# Patient Record
Sex: FEMALE | Employment: UNEMPLOYED | URBAN - METROPOLITAN AREA
[De-identification: names, ages, dates, MRNs, and addresses within clinical notes are randomized per-mention and may not be internally consistent; named-entity substitution may affect disease eponyms.]

---

## 2019-01-15 LAB
C TRACH DNA SPEC QL PROBE+SIG AMP: NEGATIVE
HBV SURFACE AG SERPL QL IA: NORMAL
HIV 1+2 AB+HIV1 P24 AG SERPL QL IA: NORMAL
N GONORRHOEA DNA SPEC QL PROBE+SIG AMP: NEGATIVE
RUBELLA ABY IGG: NORMAL
RUBELLA ANTIBODY IGG QUANTITATIVE: 5.12 IU/ML

## 2019-02-13 LAB — GROUP B STREP PCR: NEGATIVE

## 2019-02-23 ENCOUNTER — NURSE TRIAGE (OUTPATIENT)
Dept: NURSING | Facility: CLINIC | Age: 34
End: 2019-02-23

## 2019-02-23 ENCOUNTER — HOSPITAL ENCOUNTER (OUTPATIENT)
Facility: CLINIC | Age: 34
Discharge: HOME | End: 2019-02-23
Attending: OBSTETRICS & GYNECOLOGY | Admitting: OBSTETRICS & GYNECOLOGY
Payer: MEDICAID

## 2019-02-23 VITALS — DIASTOLIC BLOOD PRESSURE: 74 MMHG | RESPIRATION RATE: 18 BRPM | SYSTOLIC BLOOD PRESSURE: 130 MMHG | TEMPERATURE: 98.6 F

## 2019-02-23 DIAGNOSIS — Z36.89 ENCOUNTER FOR TRIAGE IN PREGNANT PATIENT: Primary | ICD-10-CM

## 2019-02-23 LAB
ALBUMIN UR-MCNC: NEGATIVE MG/DL
APPEARANCE UR: ABNORMAL
BACTERIA #/AREA URNS HPF: ABNORMAL /HPF
BILIRUB UR QL STRIP: NEGATIVE
COLOR UR AUTO: YELLOW
GLUCOSE UR STRIP-MCNC: NEGATIVE MG/DL
HGB UR QL STRIP: NEGATIVE
KETONES UR STRIP-MCNC: 20 MG/DL
LEUKOCYTE ESTERASE UR QL STRIP: ABNORMAL
MUCOUS THREADS #/AREA URNS LPF: PRESENT /LPF
NITRATE UR QL: POSITIVE
PH UR STRIP: 7 PH (ref 5–7)
RBC #/AREA URNS AUTO: 1 /HPF (ref 0–2)
SOURCE: ABNORMAL
SP GR UR STRIP: 1.01 (ref 1–1.03)
SQUAMOUS #/AREA URNS AUTO: 4 /HPF (ref 0–1)
TRANS CELLS #/AREA URNS HPF: 1 /HPF (ref 0–1)
UROBILINOGEN UR STRIP-MCNC: 0 MG/DL (ref 0–2)
WBC #/AREA URNS AUTO: 4 /HPF (ref 0–5)

## 2019-02-23 PROCEDURE — 81001 URINALYSIS AUTO W/SCOPE: CPT | Performed by: OBSTETRICS & GYNECOLOGY

## 2019-02-23 PROCEDURE — 87086 URINE CULTURE/COLONY COUNT: CPT | Performed by: OBSTETRICS & GYNECOLOGY

## 2019-02-23 PROCEDURE — 59025 FETAL NON-STRESS TEST: CPT

## 2019-02-23 PROCEDURE — 25000132 ZZH RX MED GY IP 250 OP 250 PS 637: Performed by: OBSTETRICS & GYNECOLOGY

## 2019-02-23 PROCEDURE — 87186 SC STD MICRODIL/AGAR DIL: CPT | Performed by: OBSTETRICS & GYNECOLOGY

## 2019-02-23 PROCEDURE — 87088 URINE BACTERIA CULTURE: CPT | Performed by: OBSTETRICS & GYNECOLOGY

## 2019-02-23 PROCEDURE — G0463 HOSPITAL OUTPT CLINIC VISIT: HCPCS | Mod: 25

## 2019-02-23 RX ORDER — HYDROXYZINE HYDROCHLORIDE 50 MG/1
100 TABLET, FILM COATED ORAL ONCE
Status: COMPLETED | OUTPATIENT
Start: 2019-02-23 | End: 2019-02-23

## 2019-02-23 RX ORDER — FLUCONAZOLE 100 MG/1
100 TABLET ORAL DAILY
Status: ON HOLD | COMMUNITY
End: 2019-02-26

## 2019-02-23 RX ORDER — NITROFURANTOIN 25; 75 MG/1; MG/1
100 CAPSULE ORAL 2 TIMES DAILY
Qty: 14 CAPSULE | Refills: 0 | Status: ON HOLD | OUTPATIENT
Start: 2019-02-23 | End: 2019-02-26

## 2019-02-23 RX ADMIN — HYDROXYZINE HYDROCHLORIDE 100 MG: 50 TABLET, FILM COATED ORAL at 21:39

## 2019-02-23 NOTE — TELEPHONE ENCOUNTER
"C/o R lower abdominal pain for 10 min. States she is 37 wks pregnant. OB provider is Dr. Messi Savage @ [facility]. Advised pt it is best that she call her own provider's office now and talk w/ the on-call doctor or a triage nurse. Advised her to wait on the line even if it says clinic is closed or follow the prompts. Go to L&D if unable to reach provider w/i next 1 hour. Go immediately if you feel worse. Pt voiced understanding and agreement. Darshana Oreilly RN/FNA      Reason for Disposition    MODERATE-SEVERE abdominal pain (e.g., interferes with normal activities, awakens from sleep)    Additional Information    Negative: Passed out (i.e., lost consciousness, collapsed and was not responding)    Negative: Shock suspected (e.g., cold/pale/clammy skin, too weak to stand, low BP, rapid pulse)    Negative: Difficult to awaken or acting confused  (e.g., disoriented, slurred speech)    Negative: [1] SEVERE abdominal pain (e.g., excruciating) AND [2] constant AND [3] present > 1 hour    Negative: SEVERE vaginal bleeding (e.g., continuous red blood from vagina, or large blood clots)    Negative: Sounds like a life-threatening emergency to the triager    Negative: Followed an abdomen (stomach) injury    Negative: [1] Having contractions or other symptoms of labor AND [2] >= 37 weeks pregnant (i.e., term pregnancy)    Negative: [1] Having contractions or other symptoms of labor AND [2] < 37 weeks pregnant (i.e., )    Negative: [1] Abdominal pain AND [2] pregnant < 20 weeks    Negative: [1] Vomiting AND [2] contains red blood or black (\"coffee ground\") material  (Exception: few red streaks in vomit that only happened once)    Protocols used: PREGNANCY - ABDOMINAL PAIN GREATER THAN 20 WEEKS EGA-ADULT-      "

## 2019-02-24 ENCOUNTER — ANESTHESIA (OUTPATIENT)
Dept: OBGYN | Facility: CLINIC | Age: 34
End: 2019-02-24
Payer: MEDICAID

## 2019-02-24 ENCOUNTER — ANESTHESIA EVENT (OUTPATIENT)
Dept: OBGYN | Facility: CLINIC | Age: 34
End: 2019-02-24
Payer: MEDICAID

## 2019-02-24 ENCOUNTER — HOSPITAL ENCOUNTER (INPATIENT)
Facility: CLINIC | Age: 34
LOS: 2 days | Discharge: HOME | End: 2019-02-26
Attending: OBSTETRICS & GYNECOLOGY | Admitting: OBSTETRICS & GYNECOLOGY
Payer: MEDICAID

## 2019-02-24 DIAGNOSIS — O23.43 URINARY TRACT INFECTION IN MOTHER DURING THIRD TRIMESTER OF PREGNANCY: ICD-10-CM

## 2019-02-24 LAB
ABO + RH BLD: NORMAL
ABO + RH BLD: NORMAL
BLD GP AB SCN SERPL QL: NORMAL
BLOOD BANK CMNT PATIENT-IMP: NORMAL
HGB BLD-MCNC: 12.4 G/DL (ref 11.7–15.7)
SPECIMEN EXP DATE BLD: NORMAL

## 2019-02-24 PROCEDURE — 86901 BLOOD TYPING SEROLOGIC RH(D): CPT | Performed by: OBSTETRICS & GYNECOLOGY

## 2019-02-24 PROCEDURE — 88307 TISSUE EXAM BY PATHOLOGIST: CPT | Performed by: OBSTETRICS & GYNECOLOGY

## 2019-02-24 PROCEDURE — 37000011 ZZH ANESTHESIA WARD SERVICE

## 2019-02-24 PROCEDURE — 25800030 ZZH RX IP 258 OP 636: Performed by: OBSTETRICS & GYNECOLOGY

## 2019-02-24 PROCEDURE — 88341 IMHCHEM/IMCYTCHM EA ADD ANTB: CPT | Mod: 26 | Performed by: OBSTETRICS & GYNECOLOGY

## 2019-02-24 PROCEDURE — 00HU33Z INSERTION OF INFUSION DEVICE INTO SPINAL CANAL, PERCUTANEOUS APPROACH: ICD-10-PCS | Performed by: ANESTHESIOLOGY

## 2019-02-24 PROCEDURE — 12000000 ZZH R&B MED SURG/OB

## 2019-02-24 PROCEDURE — 85018 HEMOGLOBIN: CPT | Performed by: OBSTETRICS & GYNECOLOGY

## 2019-02-24 PROCEDURE — 86900 BLOOD TYPING SEROLOGIC ABO: CPT | Performed by: OBSTETRICS & GYNECOLOGY

## 2019-02-24 PROCEDURE — 88342 IMHCHEM/IMCYTCHM 1ST ANTB: CPT | Performed by: OBSTETRICS & GYNECOLOGY

## 2019-02-24 PROCEDURE — G0463 HOSPITAL OUTPT CLINIC VISIT: HCPCS

## 2019-02-24 PROCEDURE — 88307 TISSUE EXAM BY PATHOLOGIST: CPT | Mod: 26 | Performed by: OBSTETRICS & GYNECOLOGY

## 2019-02-24 PROCEDURE — 72200001 ZZH LABOR CARE VAGINAL DELIVERY SINGLE

## 2019-02-24 PROCEDURE — 88341 IMHCHEM/IMCYTCHM EA ADD ANTB: CPT | Performed by: OBSTETRICS & GYNECOLOGY

## 2019-02-24 PROCEDURE — 88342 IMHCHEM/IMCYTCHM 1ST ANTB: CPT | Mod: 26 | Performed by: OBSTETRICS & GYNECOLOGY

## 2019-02-24 PROCEDURE — 0064U ANTB TP TOTAL&RPR IA QUAL: CPT | Performed by: OBSTETRICS & GYNECOLOGY

## 2019-02-24 PROCEDURE — 86850 RBC ANTIBODY SCREEN: CPT | Performed by: OBSTETRICS & GYNECOLOGY

## 2019-02-24 PROCEDURE — 25000128 H RX IP 250 OP 636: Performed by: ANESTHESIOLOGY

## 2019-02-24 PROCEDURE — 40000671 ZZH STATISTIC ANESTHESIA CASE

## 2019-02-24 PROCEDURE — 25000125 ZZHC RX 250: Performed by: OBSTETRICS & GYNECOLOGY

## 2019-02-24 PROCEDURE — 3E0R3BZ INTRODUCTION OF ANESTHETIC AGENT INTO SPINAL CANAL, PERCUTANEOUS APPROACH: ICD-10-PCS | Performed by: ANESTHESIOLOGY

## 2019-02-24 RX ORDER — PRENATAL VIT/IRON FUM/FOLIC AC 27MG-0.8MG
1 TABLET ORAL DAILY
COMMUNITY

## 2019-02-24 RX ORDER — NALBUPHINE HYDROCHLORIDE 10 MG/ML
2.5-5 INJECTION, SOLUTION INTRAMUSCULAR; INTRAVENOUS; SUBCUTANEOUS EVERY 6 HOURS PRN
Status: DISCONTINUED | OUTPATIENT
Start: 2019-02-24 | End: 2019-02-25 | Stop reason: HOSPADM

## 2019-02-24 RX ORDER — FENTANYL CITRATE 50 UG/ML
50-100 INJECTION, SOLUTION INTRAMUSCULAR; INTRAVENOUS
Status: DISCONTINUED | OUTPATIENT
Start: 2019-02-24 | End: 2019-02-26 | Stop reason: HOSPADM

## 2019-02-24 RX ORDER — NALOXONE HYDROCHLORIDE 0.4 MG/ML
.1-.4 INJECTION, SOLUTION INTRAMUSCULAR; INTRAVENOUS; SUBCUTANEOUS
Status: DISCONTINUED | OUTPATIENT
Start: 2019-02-24 | End: 2019-02-25

## 2019-02-24 RX ORDER — ONDANSETRON 4 MG/1
4 TABLET, ORALLY DISINTEGRATING ORAL EVERY 6 HOURS PRN
Status: DISCONTINUED | OUTPATIENT
Start: 2019-02-24 | End: 2019-02-25 | Stop reason: HOSPADM

## 2019-02-24 RX ORDER — EPHEDRINE SULFATE 50 MG/ML
INJECTION, SOLUTION INTRAMUSCULAR; INTRAVENOUS; SUBCUTANEOUS
Status: DISCONTINUED
Start: 2019-02-24 | End: 2019-02-25 | Stop reason: WASHOUT

## 2019-02-24 RX ORDER — ACETAMINOPHEN 325 MG/1
650 TABLET ORAL EVERY 4 HOURS PRN
Status: DISCONTINUED | OUTPATIENT
Start: 2019-02-24 | End: 2019-02-25

## 2019-02-24 RX ORDER — METHYLERGONOVINE MALEATE 0.2 MG/ML
200 INJECTION INTRAVENOUS
Status: DISCONTINUED | OUTPATIENT
Start: 2019-02-24 | End: 2019-02-25

## 2019-02-24 RX ORDER — OXYCODONE AND ACETAMINOPHEN 5; 325 MG/1; MG/1
1 TABLET ORAL
Status: DISCONTINUED | OUTPATIENT
Start: 2019-02-24 | End: 2019-02-25

## 2019-02-24 RX ORDER — NALOXONE HYDROCHLORIDE 0.4 MG/ML
.1-.4 INJECTION, SOLUTION INTRAMUSCULAR; INTRAVENOUS; SUBCUTANEOUS
Status: DISCONTINUED | OUTPATIENT
Start: 2019-02-24 | End: 2019-02-24

## 2019-02-24 RX ORDER — CARBOPROST TROMETHAMINE 250 UG/ML
250 INJECTION, SOLUTION INTRAMUSCULAR
Status: DISCONTINUED | OUTPATIENT
Start: 2019-02-24 | End: 2019-02-25

## 2019-02-24 RX ORDER — BUPIVACAINE HCL/0.9 % NACL/PF 0.125 %
PLASTIC BAG, INJECTION (ML) EPIDURAL
Status: DISCONTINUED
Start: 2019-02-24 | End: 2019-02-25 | Stop reason: HOSPADM

## 2019-02-24 RX ORDER — BUPIVACAINE HCL/0.9 % NACL/PF 0.125 %
PLASTIC BAG, INJECTION (ML) EPIDURAL CONTINUOUS
Status: DISCONTINUED | OUTPATIENT
Start: 2019-02-24 | End: 2019-02-25 | Stop reason: HOSPADM

## 2019-02-24 RX ORDER — ONDANSETRON 2 MG/ML
4 INJECTION INTRAMUSCULAR; INTRAVENOUS EVERY 6 HOURS PRN
Status: DISCONTINUED | OUTPATIENT
Start: 2019-02-24 | End: 2019-02-25 | Stop reason: HOSPADM

## 2019-02-24 RX ORDER — OXYTOCIN 10 [USP'U]/ML
10 INJECTION, SOLUTION INTRAMUSCULAR; INTRAVENOUS
Status: DISCONTINUED | OUTPATIENT
Start: 2019-02-24 | End: 2019-02-25

## 2019-02-24 RX ORDER — SODIUM CHLORIDE, SODIUM LACTATE, POTASSIUM CHLORIDE, CALCIUM CHLORIDE 600; 310; 30; 20 MG/100ML; MG/100ML; MG/100ML; MG/100ML
INJECTION, SOLUTION INTRAVENOUS CONTINUOUS
Status: DISCONTINUED | OUTPATIENT
Start: 2019-02-24 | End: 2019-02-26 | Stop reason: HOSPADM

## 2019-02-24 RX ORDER — ONDANSETRON 2 MG/ML
4 INJECTION INTRAMUSCULAR; INTRAVENOUS EVERY 6 HOURS PRN
Status: DISCONTINUED | OUTPATIENT
Start: 2019-02-24 | End: 2019-02-24

## 2019-02-24 RX ORDER — EPHEDRINE SULFATE 50 MG/ML
5 INJECTION, SOLUTION INTRAMUSCULAR; INTRAVENOUS; SUBCUTANEOUS
Status: DISCONTINUED | OUTPATIENT
Start: 2019-02-24 | End: 2019-02-25 | Stop reason: HOSPADM

## 2019-02-24 RX ORDER — OXYTOCIN/0.9 % SODIUM CHLORIDE 30/500 ML
100-340 PLASTIC BAG, INJECTION (ML) INTRAVENOUS CONTINUOUS PRN
Status: COMPLETED | OUTPATIENT
Start: 2019-02-24 | End: 2019-02-24

## 2019-02-24 RX ORDER — IBUPROFEN 800 MG/1
800 TABLET, FILM COATED ORAL
Status: DISCONTINUED | OUTPATIENT
Start: 2019-02-24 | End: 2019-02-25

## 2019-02-24 RX ORDER — LIDOCAINE HYDROCHLORIDE AND EPINEPHRINE 15; 5 MG/ML; UG/ML
3 INJECTION, SOLUTION EPIDURAL
Status: DISCONTINUED | OUTPATIENT
Start: 2019-02-24 | End: 2019-02-25 | Stop reason: HOSPADM

## 2019-02-24 RX ADMIN — SODIUM CHLORIDE, POTASSIUM CHLORIDE, SODIUM LACTATE AND CALCIUM CHLORIDE 500 ML: 600; 310; 30; 20 INJECTION, SOLUTION INTRAVENOUS at 23:28

## 2019-02-24 RX ADMIN — SODIUM CHLORIDE, POTASSIUM CHLORIDE, SODIUM LACTATE AND CALCIUM CHLORIDE 500 ML: 600; 310; 30; 20 INJECTION, SOLUTION INTRAVENOUS at 22:09

## 2019-02-24 RX ADMIN — SODIUM CHLORIDE, POTASSIUM CHLORIDE, SODIUM LACTATE AND CALCIUM CHLORIDE: 600; 310; 30; 20 INJECTION, SOLUTION INTRAVENOUS at 22:09

## 2019-02-24 RX ADMIN — Medication: at 22:30

## 2019-02-24 RX ADMIN — OXYTOCIN-SODIUM CHLORIDE 0.9% IV SOLN 30 UNIT/500ML 340 ML/HR: 30-0.9/5 SOLUTION at 23:50

## 2019-02-24 ASSESSMENT — ACTIVITIES OF DAILY LIVING (ADL): FALL_HISTORY_WITHIN_LAST_SIX_MONTHS: NO

## 2019-02-24 NOTE — PLAN OF CARE
Data: [facility]: 2019  5:41 PM.  Reason for maternal/fetal assessment is uterine contractions, decreased fetal movement. Patient reports abdominal pain approximately 1+ hour ago that has turned into contractions.  Patient is a .  Prenatal record reviewed. Pregnancy has been uncomplicated..  Gestational Age 37w2d. VSS. Fetal movement present. Patient denies leaking of vaginal fluid/rupture of membranes, vaginal bleeding, pelvic pressure, nausea, vomiting, headache, visual disturbances, epigastric or URQ pain, significant edema. Support person is present-sister of patient  Action: Verbal consent for EFM. Triage assessment completed. Bill of rights reviewed.  Response: Patient verbalized agreement with plan. Will contact Dr Lili Vincent with update and further orders.

## 2019-02-24 NOTE — PROVIDER NOTIFICATION
02/23/19 1844   Provider Notification   Provider Name/Title Dr Vincent   Method of Notification Electronic Page   Notification Reason Patient Arrived;SVE;Status Update;Uterine Activity;Pain     MD notified of patient arrival for rule out labor/DFM  Unfavorable cervix at 1.5/40/-4  Patient given option to go home or stay and be re-checked- patient would like to stay  MD okay with this plan and would like patient to orally hydrate which patient is doing currently  Category 1 tracing  Update MD after next check.

## 2019-02-24 NOTE — PROVIDER NOTIFICATION
02/23/19 2120   Provider Notification   Provider Name/Title Dr. Vincent   Method of Notification Phone   Request Evaluate - Remote   Notification Reason Lab/Diagnostic Study   Md notified of UA results  And cerivcal exam was the same.  Orders received to send pt home with Macrobid.  Pt may have a one time dose of Vistaril before discharge and then to follow up in the clinic this week.

## 2019-02-24 NOTE — DISCHARGE INSTRUCTIONS
Discharge Instruction for Undelivered Patients      You were seen for: Labor Assessment  We Consulted: Dr. Vincent  You had (Test or Medicine):fetal monitoring, oral hydration, cervical exam and UA    Diet:   Drink 8 to 12 glasses of liquids (milk, juice, water) every day.  You may eat meals and snacks.     Activity:  Count fetal kicks everyday (see handout)  Call your doctor or nurse midwife if your baby is moving less than usual.     Call your provider if you notice:  Swelling in your face or increased swelling in your hands or legs.  Headaches that are not relieved by Tylenol (acetaminophen).  Changes in your vision (blurring: seeing spots or stars.)  Nausea (sick to your stomach) and vomiting (throwing up).   Weight gain of 5 pounds or more per week.  Heartburn that doesn't go away.  Signs of bladder infection: pain when you urinate (use the toilet), need to go more often and more urgently.  The bag of mishra (rupture of membranes) breaks, or you notice leaking in your underwear.  Bright red blood in your underwear.  Abdominal (lower belly) or stomach pain.  Second (plus) baby: Contractions (tightening) less than 10 minutes apart and getting stronger.  Increase or change in vaginal discharge (note the color and amount)  Other: MD would like to take MAcrobid 100mg twice daily for 7 days.  Md would like you to be seen this week in clinic. Ok to take 1000mg of tylenol oral every 6-8 hours.    Follow-up:  Make an appointment to be seen on this week.

## 2019-02-25 LAB — T PALLIDUM AB SER QL: NONREACTIVE

## 2019-02-25 PROCEDURE — 0HQ9XZZ REPAIR PERINEUM SKIN, EXTERNAL APPROACH: ICD-10-PCS | Performed by: OBSTETRICS & GYNECOLOGY

## 2019-02-25 PROCEDURE — 25000132 ZZH RX MED GY IP 250 OP 250 PS 637: Performed by: OBSTETRICS & GYNECOLOGY

## 2019-02-25 PROCEDURE — 12000000 ZZH R&B MED SURG/OB

## 2019-02-25 PROCEDURE — G0463 HOSPITAL OUTPT CLINIC VISIT: HCPCS

## 2019-02-25 PROCEDURE — 40000084 ZZH STATISTIC IP LACTATION SERVICES 16-30 MIN

## 2019-02-25 PROCEDURE — 72200001 ZZH LABOR CARE VAGINAL DELIVERY SINGLE

## 2019-02-25 RX ORDER — AMPICILLIN TRIHYDRATE 500 MG
500 CAPSULE ORAL EVERY 6 HOURS SCHEDULED
Status: DISCONTINUED | OUTPATIENT
Start: 2019-02-25 | End: 2019-02-26 | Stop reason: HOSPADM

## 2019-02-25 RX ORDER — OXYTOCIN/0.9 % SODIUM CHLORIDE 30/500 ML
340 PLASTIC BAG, INJECTION (ML) INTRAVENOUS CONTINUOUS PRN
Status: DISCONTINUED | OUTPATIENT
Start: 2019-02-25 | End: 2019-02-26 | Stop reason: HOSPADM

## 2019-02-25 RX ORDER — METHYLERGONOVINE MALEATE 0.2 MG/ML
200 INJECTION INTRAVENOUS
Status: DISCONTINUED | OUTPATIENT
Start: 2019-02-25 | End: 2019-02-26 | Stop reason: HOSPADM

## 2019-02-25 RX ORDER — HYDROCORTISONE 2.5 %
CREAM (GRAM) TOPICAL 3 TIMES DAILY PRN
Status: DISCONTINUED | OUTPATIENT
Start: 2019-02-25 | End: 2019-02-26 | Stop reason: HOSPADM

## 2019-02-25 RX ORDER — AMOXICILLIN 250 MG
1 CAPSULE ORAL 2 TIMES DAILY
Status: DISCONTINUED | OUTPATIENT
Start: 2019-02-25 | End: 2019-02-26 | Stop reason: HOSPADM

## 2019-02-25 RX ORDER — IBUPROFEN 800 MG/1
800 TABLET, FILM COATED ORAL EVERY 6 HOURS PRN
Status: DISCONTINUED | OUTPATIENT
Start: 2019-02-25 | End: 2019-02-26 | Stop reason: HOSPADM

## 2019-02-25 RX ORDER — BISACODYL 10 MG
10 SUPPOSITORY, RECTAL RECTAL DAILY PRN
Status: DISCONTINUED | OUTPATIENT
Start: 2019-02-27 | End: 2019-02-26 | Stop reason: HOSPADM

## 2019-02-25 RX ORDER — FLUCONAZOLE 150 MG/1
150 TABLET ORAL ONCE
Status: COMPLETED | OUTPATIENT
Start: 2019-02-25 | End: 2019-02-25

## 2019-02-25 RX ORDER — NALOXONE HYDROCHLORIDE 0.4 MG/ML
.1-.4 INJECTION, SOLUTION INTRAMUSCULAR; INTRAVENOUS; SUBCUTANEOUS
Status: DISCONTINUED | OUTPATIENT
Start: 2019-02-25 | End: 2019-02-26 | Stop reason: HOSPADM

## 2019-02-25 RX ORDER — OXYTOCIN 10 [USP'U]/ML
10 INJECTION, SOLUTION INTRAMUSCULAR; INTRAVENOUS
Status: DISCONTINUED | OUTPATIENT
Start: 2019-02-25 | End: 2019-02-26 | Stop reason: HOSPADM

## 2019-02-25 RX ORDER — CARBOPROST TROMETHAMINE 250 UG/ML
250 INJECTION, SOLUTION INTRAMUSCULAR
Status: DISCONTINUED | OUTPATIENT
Start: 2019-02-25 | End: 2019-02-26 | Stop reason: HOSPADM

## 2019-02-25 RX ORDER — LANOLIN 100 %
OINTMENT (GRAM) TOPICAL
Status: DISCONTINUED | OUTPATIENT
Start: 2019-02-25 | End: 2019-02-26 | Stop reason: HOSPADM

## 2019-02-25 RX ORDER — OXYCODONE HYDROCHLORIDE 5 MG/1
5 TABLET ORAL EVERY 4 HOURS PRN
Status: DISCONTINUED | OUTPATIENT
Start: 2019-02-25 | End: 2019-02-26 | Stop reason: HOSPADM

## 2019-02-25 RX ORDER — MISOPROSTOL 200 UG/1
800 TABLET ORAL
Status: DISCONTINUED | OUTPATIENT
Start: 2019-02-25 | End: 2019-02-26 | Stop reason: HOSPADM

## 2019-02-25 RX ORDER — NITROFURANTOIN 25; 75 MG/1; MG/1
100 CAPSULE ORAL EVERY 12 HOURS SCHEDULED
Status: DISCONTINUED | OUTPATIENT
Start: 2019-02-25 | End: 2019-02-25

## 2019-02-25 RX ORDER — AMOXICILLIN 250 MG
2 CAPSULE ORAL 2 TIMES DAILY
Status: DISCONTINUED | OUTPATIENT
Start: 2019-02-25 | End: 2019-02-26 | Stop reason: HOSPADM

## 2019-02-25 RX ORDER — ACETAMINOPHEN 325 MG/1
650 TABLET ORAL EVERY 4 HOURS PRN
Status: DISCONTINUED | OUTPATIENT
Start: 2019-02-25 | End: 2019-02-26 | Stop reason: HOSPADM

## 2019-02-25 RX ORDER — OXYTOCIN/0.9 % SODIUM CHLORIDE 30/500 ML
100 PLASTIC BAG, INJECTION (ML) INTRAVENOUS CONTINUOUS
Status: DISCONTINUED | OUTPATIENT
Start: 2019-02-25 | End: 2019-02-26 | Stop reason: HOSPADM

## 2019-02-25 RX ADMIN — IBUPROFEN 800 MG: 800 TABLET ORAL at 12:07

## 2019-02-25 RX ADMIN — OXYCODONE HYDROCHLORIDE 5 MG: 5 TABLET ORAL at 03:01

## 2019-02-25 RX ADMIN — NITROFURANTOIN (MONOHYDRATE/MACROCRYSTALS) 100 MG: 75; 25 CAPSULE ORAL at 10:23

## 2019-02-25 RX ADMIN — IBUPROFEN 800 MG: 800 TABLET ORAL at 19:07

## 2019-02-25 RX ADMIN — SENNOSIDES AND DOCUSATE SODIUM 1 TABLET: 8.6; 5 TABLET ORAL at 21:12

## 2019-02-25 RX ADMIN — IBUPROFEN 800 MG: 800 TABLET ORAL at 01:43

## 2019-02-25 RX ADMIN — AMPICILLIN 500 MG: 500 CAPSULE ORAL at 21:11

## 2019-02-25 RX ADMIN — FLUCONAZOLE 150 MG: 150 TABLET ORAL at 10:23

## 2019-02-25 RX ADMIN — SENNOSIDES AND DOCUSATE SODIUM 1 TABLET: 8.6; 5 TABLET ORAL at 08:26

## 2019-02-25 RX ADMIN — OXYCODONE HYDROCHLORIDE 5 MG: 5 TABLET ORAL at 08:26

## 2019-02-25 NOTE — ANESTHESIA PREPROCEDURE EVALUATION
"Anesthesia Pre-Procedure Evaluation    Patient: Eddie Wong   MRN: [id number] : 1985          Preoperative Diagnosis: * No surgery found *        History reviewed. No pertinent past medical history.  History reviewed. No pertinent surgical history.  Anesthesia Evaluation       history and physical reviewed .      No history of anesthetic complications          ROS/MED HX    ENT/Pulmonary:       Neurologic:       Cardiovascular:         METS/Exercise Tolerance:     Hematologic:         Musculoskeletal:         GI/Hepatic:         Renal/Genitourinary:         Endo:         Psychiatric:         Infectious Disease:         Malignancy:         Other:                          Physical Exam      Airway     Dental     Cardiovascular       Pulmonary     Other findings: GARCIA FOR VAGINAL; DELIVERY        Lab Results   Component Value Date    HGB 12.4 2019       Preop Vitals  BP Readings from Last 3 Encounters:   19 131/85   19 130/74   14 121/71    Pulse Readings from Last 3 Encounters:   14 87      Resp Readings from Last 3 Encounters:   19 18   14 18    SpO2 Readings from Last 3 Encounters:   14 98%      Temp Readings from Last 1 Encounters:   19 98.8  F (37.1  C) (Oral)    Ht Readings from Last 1 Encounters:   14 1.676 m (5' 6\")      Wt Readings from Last 1 Encounters:   No data found for Wt    There is no height or weight on file to calculate BMI.       Anesthesia Plan      History & Physical Review  History and physical reviewed and following examination; no interval change.    ASA Status:  1 .  OB Epidural Asa: 1       Plan for Epidural     Garcia for vaginaL DELIVERY      Postoperative Care      Consents                 Alec Ochoa MD                    .  "

## 2019-02-25 NOTE — PLAN OF CARE
Pt's pain is controlled with ibuprofen and oxycodone, was working on breast feeding this shift, seen by lactation, needs help with positioning, infant latched better in football position, voiding without difficulties, care of plan is updated with pt.

## 2019-02-25 NOTE — PLAN OF CARE
From [id number]- Patient's SVE 10/100/0 at 2302, at the time of cervix completion, FHT's dipped to the 60's x 30 seconds with a contraction starting. Patient was on her left side. Nursing staff then turned patient to her right side, administered O2, started and IV fluid flush with LR. FHT's remained with moderate variability. FHT's dipped again to the 60's x 30 sec at 2308 with a contraction. Variable prolonged decel. Patient then turned to the other side to relieve pressure. Deep variables continued every 3 minutes until delivery which was . Dr. Toussaint arrived at bedside at 2320 for delivery in which patient then was placed into stirrups so she then could push.  team arrived for delivery about 3 minutes after that. Vacuum was used for delivery. Applied onto baby's head at 2342 was applied to baby's head for 50 seconds, 1 pull 10 sec . Delivery of baby girl at 2344.

## 2019-02-25 NOTE — PROVIDER NOTIFICATION
02/24/19 2115   Provider Notification   Provider Name/Title Dr. Toussaint   Method of Notification Phone   Request Evaluate - Remote   Notification Reason Patient Arrived;Labor Status;Uterine Activity;Pain;Status Update;SVE   MD notified of patient's arrival, FHT's moderate variability with accels and no decels, pain level, contraction pattern, contractions that palpate mild. Notified MD that patient is in so much pain that she will not answer any questions. She has a sister with her, her sister answers some for her. Notified MD that patient was here yesterday with contractions and found out she had a UTI and send home with meds and what her SVE was. SVE here 3/90/-2. Notified MD that patient hollering out in pain, writhing all over in the bed and not cooperating.   Received orders to admit for labor.

## 2019-02-25 NOTE — L&D DELIVERY NOTE
[facility]  Vacuum-Assisted Vaginal Delivery Note    Indication for operative delivery: Fetal heart rate decelerations       Adequate anesthesia was established with epidural anesthesia. The patient was already in the dorsal lithotomy position.  The patient's bladder was emptied right before she started pushing and this was less than 20 min before we started pushing. Fetal position was confirmed to be at +2 station with confirmed rupture of membranes. A disposable rigid mushroom vacuum extraction cup was appiled to the fetal median flexion point. It was centered over the sagittal sutures approximately 3cm anterior to the posterior fontanelle. The area around the vacuum cup was palpated to confirm that there was no maternal tissue within the cup margin. With the start of the contraction, a vacuum seal was established at 55cmHg, and gentle traction was applied. Advancement of the fetal head was noted with gentle traction applied to the device. A total of 1 pulls were performed, and 0 popoffs occurred. No reapplications were performed.    The vacuum was released upon  of the fetal head, and delivery was performed thereafter with the assistance of maternal expulsive efforts with contractions. Anterior shoulder delivered atraumatically by maternal expulsive efforts with the assistance of downward traction. The posterior shoulder was delivered with maternal expulsive efforts and upward traction. The remainder of the fetus delivered spontaneously. The infant was bulb-suctioned on abdomen and immediately passed to awaiting pediatricians, no delayed cord clamp was done  Cord blood for gases collected and sent.     The placenta delivered spontaneously, intact, with a 3-vessel cord. To enhance uterine contractions, 20 units of IV Pitocin were administered.    The vaginal vault and perineum were examined for any delivery-related injuries. Under residual epidural anesthetic effects a first degree laceration was  repaired in the usual fashion using 3-0 vicryl suture, a right lateral vaginal wall laceration was repaired in the usual fashion with a 4-0 vicryl suture.     At delivery, the infant was noted to be moving all extremities appropriately.  JPJ=[id number]    Dr. Christy Toussaint  [phone]

## 2019-02-25 NOTE — PLAN OF CARE
Data: [facility]: 2019  7:49 PM.  Reason for maternal/fetal assessment is uterine contractions, abdominal pain, pelvic pain. Patient reports wilmer since yesterday, was here in labor and delivery and found to have a UTI and put on Macrobid. Patient states she has been wilmer last night, throughout today. Placed on external monitors. However, patient refused to answer questions because she is having pain. Her sister accompanies her.  Patient is a .  Prenatal record reviewed. Pregnancy  has been uncomplicated.  Gestational Age 37w3d. VSS. Fetal movement present. Patient denies leaking of vaginal fluid/rupture of membranes, vaginal bleeding, abdominal pain, pelvic pressure, nausea, vomiting, headache, visual disturbances, epigastric or URQ pain, significant edema. Support person is present.   Action: Verbal consent for EFM. Triage assessment completed. Bill of rights reviewed.  Response: Patient verbalized agreement with plan. Will contact Dr Elina Toussaint with update and further orders.

## 2019-02-25 NOTE — PROGRESS NOTES
Patient comfotable. Tolerating regular diet. Urinating ok. Bleeding moderate/minimal. Pain under control with ibuprofen.   Was given prescription for macrobid and diflucan prior to admission, wants to know about taking this.     Breast feeding.     /70   Pulse 84   Temp 98.2  F (36.8  C) (Oral)   Resp 17   LMP 06/07/2018   Breastfeeding? Unknown   Gen: well female NAD  FF: umb - 1  Ext: NT no cords    A/P  Post partum day #1  Doing well  Routine post partum cares  OK to take macrodantin and diflucan, will order.   Anticipate discharge tomorrow.     Sangita Springer MD on 2/25/2019 at 9:46 AM

## 2019-02-25 NOTE — PROGRESS NOTES
Public Health Nurse (PHN) spoke with patient regarding [facility] services and  Resources.  Family provided [facility] Public  Health resources handout,  Car seat check resources card. Patient accepted referral for home visiting services, Susan Warning given, referral completed electronically. Patient declined any questions or concerns.

## 2019-02-25 NOTE — PLAN OF CARE
Data: Eddie Wong transferred to 428 via wheelchair at 0330.  Baby transferred via parent's arms.  Action: Receiving unit notified of transfer: Yes. Patient and family notified of room change. Report will be given to dayshift  at shift change , this nurse will be taking care of patient from now until 0700.  Patient was unable to void prior to coming over to Postpartum unit. Straight cathed patient for 500 ml clear yellow urine. Instructed patient to call prior to getting up to bathroom next time patient needs to urinate. Belongings sent to receiving unit. Accompanied by Registered Nurse. Oriented patient to surroundings. Call light within reach. ID bands double-checked with receiving RN.  Response: Patient tolerated transfer and is stable.

## 2019-02-25 NOTE — ANESTHESIA PROCEDURE NOTES
Peripheral nerve/Neuraxial procedure note : epidural catheter  Pre-Procedure  Performed by Alec Ochoa MD  Referred by AMPARO  Location: OB    Procedure Times:2/24/2019 10:36 PM and 2/24/2019 10:53 PM  Pre-Anesthestic Checklist: patient identified, IV checked, risks and benefits discussed, informed consent, monitors and equipment checked, pre-op evaluation and at physician/surgeon's request    Timeout  Correct Patient: Yes   Correct Procedure: Yes   Correct Site: Yes   Correct Laterality: N/A   Correct Position: Yes   Site Marked: N/A   .   Procedure Documentation    Diagnosis:labor.    Procedure:    Epidural catheter.  Insertion Site:L3-4  (midline approach) Injection technique: LORT air   Local skin infiltrated with 2 mL of 1% lidocaine.  JUSTINO at 8 cm     Patient Prep;mask, sterile gloves, povidone-iodine 7.5% surgical scrub, patient draped.  .  Needle: Touhy needle Needle Gauge: 17.    Needle Length (Inches) 3.5  # of attempts: 1 and # of redirects:  .   Catheter: 19 G . .  Catheter threaded easily  .  13 cm at skin.   .    Assessment/Narrative  Paresthesias: No.  .  .  Aspiration negative for heme or CSF  . Test dose of 3 mL lidocaine 1.5% w/ 1:200,000 epinephrine at. Test dose negative for signs of intravascular, subdural or intrathecal injection. Comments:  Bolus 8cc .25marc

## 2019-02-25 NOTE — ANESTHESIA POSTPROCEDURE EVALUATION
Patient: Eddie Wong      S/P epidural for labor.   I or my partner was immediately available for management of this patient during epidural analgesia infusion.  VSS.  Doing well. Block resolved.  Neuro at baseline. Denies positional headache. Minimal side effects easily managed w/ PRN meds. No apparent anesthetic complications. No follow-up required.    Dhruv Morales MD    Note:  Anesthesia Post Evaluation    Last vitals:  Vitals:    02/25/19 0139 02/25/19 0153 02/25/19 0603   BP: 140/63 139/76 121/69   Pulse:   96   Resp:  18 18   Temp:   98.4  F (36.9  C)         Electronically Signed By: Dhruv Morales MD  February 25, 2019  8:02 AM

## 2019-02-25 NOTE — LACTATION NOTE
LC visit.  She reports that her first baby did not nurse.  She reports a low milk supply and is concerned she might not be making milk.  LC assisted with hand expression on both sides and after a few breast compressions, milk began flowing, especially on the left side where baby has been latching better.  LC assisted with latch on both breasts.  Good latch noted in the football hold.  Much assistance with positioning was needed to help baby onto a deeper latch.  Plan for continued assistance and encouragement.  Good feeding noted with a nutritive suck pattern once breast compressions were added.

## 2019-02-25 NOTE — H&P
2019    Eddie Wong  [id number]            OB Admit History & Physical      Ms. Wong  is here for uterine contractions, rule out labor.    She has noticed uterine contractions since two days ago. She actually was seen and evaluated on 19 for same reason. She was not found to be in labor and was sent home. She has been having uterine contractions today that have been increasing in intensity and frequency. +FM. Denies any vaginal bleeding, leaking or abnormal discharge.     Patient's last menstrual period was 2018.   Her Estimated Date of Delivery: Mar 14, 2019, making her 37w4d.      There is no height or weight on file to calculate BMI.  Her prenatal course has been  complicated by   - non immune hep B  - Zika exposure  - late transfer care from [facility] at 31 wks GA  - AMA  - recent UTI on Macrobid.    See prenatal for labs.  neg GBS, Rubella Immune, RH Positive      She is a 33 year old   Her OB history:   Obstetric History       T1      L1     SAB0   TAB0   Ectopic0   Multiple0   Live Births1       # Outcome Date GA Lbr Jared/2nd Weight Sex Delivery Anes PTL Lv   2 Current            1 Term 14 39w4d 06:30 / 03:03 2.96 kg (6 lb 8.4 oz) F Vag-Spont EPI N LEIDY      Name: IZAIAH WONG      Apgar1:  9                Apgar5: 9             History reviewed. No pertinent past medical history.     History reviewed. No pertinent surgical history.      No current outpatient medications on file.       Allergies: Patient has no known allergies.      REVIEW OF SYSTEMS:  NEUROLOGIC:  Negative  EYES:  Negative  ENT:  Negative  GI:  Negative  :  Negative  GYN:  Negative  CV:  Negative  PULMONARY:  Negative  MUSCULOSKELETAL:  Negative  PSYCH:  Negative        Social History     Socioeconomic History     Marital status: Single     Spouse name: Not on file     Number of children: Not on file     Years of education: Not on file     Highest education level: Not on file   Occupational  History     Not on file   Social Needs     Financial resource strain: Not on file     Food insecurity:     Worry: Not on file     Inability: Not on file     Transportation needs:     Medical: Not on file     Non-medical: Not on file   Tobacco Use     Smoking status: Never Smoker     Smokeless tobacco: Never Used   Substance and Sexual Activity     Alcohol use: No     Drug use: No     Sexual activity: Not Currently   Lifestyle     Physical activity:     Days per week: Not on file     Minutes per session: Not on file     Stress: Not on file   Relationships     Social connections:     Talks on phone: Not on file     Gets together: Not on file     Attends Oriental orthodox service: Not on file     Active member of club or organization: Not on file     Attends meetings of clubs or organizations: Not on file     Relationship status: Not on file     Intimate partner violence:     Fear of current or ex partner: Not on file     Emotionally abused: Not on file     Physically abused: Not on file     Forced sexual activity: Not on file   Other Topics Concern     Not on file   Social History Narrative     Not on file      History reviewed. No pertinent family history.      Exam:    Vitals:   /62   Temp 98.2  F (36.8  C) (Axillary)   Resp 18   LMP 06/07/2018   Breastfeeding? No   FHT: Reactive    [place]:  ctxs q 5-6 min    Alert Awake in NAD  HEENT grossly normal  Neck: no lymphadenopathy or thryoidomegaly  Lungs CTAB  Back No CVAT  Heart RR  ABD gravid, NABS, soft, NT on exam with NT fundus palpable  Cervix is 3 cm / 90 % effaced at -2 station, per RN  EXT:  no edema, no calf tenderness      Assessment:    1)  IUP at 37w4d GA  2) First stage of labor, latent phase  3) AMA  4) late transfer of care at 31 wks  5) Zika exposure  5) Recent UTI      Plan:    1)  Admission orders   - ok for epidural  - GBS neg    2) continue Macrobid for UTI        Elina Toussaint MD  Dept of OB/GYN  February 25, 2019

## 2019-02-25 NOTE — PROVIDER NOTIFICATION
02/24/19 2235   Provider Notification   Provider Name/Title Dr. Cervantes    Method of Notification At Bedside   MDA at bedside to place epidural

## 2019-02-26 VITALS
DIASTOLIC BLOOD PRESSURE: 70 MMHG | TEMPERATURE: 98.2 F | HEART RATE: 76 BPM | RESPIRATION RATE: 16 BRPM | SYSTOLIC BLOOD PRESSURE: 123 MMHG

## 2019-02-26 LAB
BACTERIA SPEC CULT: ABNORMAL
BACTERIA SPEC CULT: ABNORMAL
Lab: ABNORMAL
SPECIMEN SOURCE: ABNORMAL

## 2019-02-26 PROCEDURE — 25000132 ZZH RX MED GY IP 250 OP 250 PS 637: Performed by: OBSTETRICS & GYNECOLOGY

## 2019-02-26 PROCEDURE — 90746 HEPB VACCINE 3 DOSE ADULT IM: CPT | Performed by: OBSTETRICS & GYNECOLOGY

## 2019-02-26 PROCEDURE — 25000128 H RX IP 250 OP 636: Performed by: OBSTETRICS & GYNECOLOGY

## 2019-02-26 RX ORDER — AMOXICILLIN 250 MG
1 CAPSULE ORAL 2 TIMES DAILY PRN
Qty: 15 TABLET | Refills: 1 | Status: SHIPPED | OUTPATIENT
Start: 2019-02-26 | End: 2019-03-28

## 2019-02-26 RX ORDER — CEPHALEXIN 500 MG/1
500 CAPSULE ORAL 2 TIMES DAILY
Qty: 10 CAPSULE | Refills: 0 | Status: SHIPPED | OUTPATIENT
Start: 2019-02-26 | End: 2019-03-03

## 2019-02-26 RX ORDER — IBUPROFEN 800 MG/1
800 TABLET, FILM COATED ORAL EVERY 6 HOURS PRN
Qty: 30 TABLET | Refills: 1 | Status: SHIPPED | OUTPATIENT
Start: 2019-02-26 | End: 2019-03-28

## 2019-02-26 RX ADMIN — OXYCODONE HYDROCHLORIDE 5 MG: 5 TABLET ORAL at 08:29

## 2019-02-26 RX ADMIN — OXYCODONE HYDROCHLORIDE 5 MG: 5 TABLET ORAL at 02:38

## 2019-02-26 RX ADMIN — SENNOSIDES AND DOCUSATE SODIUM 1 TABLET: 8.6; 5 TABLET ORAL at 08:29

## 2019-02-26 RX ADMIN — AMPICILLIN 500 MG: 500 CAPSULE ORAL at 10:12

## 2019-02-26 RX ADMIN — OXYCODONE HYDROCHLORIDE 5 MG: 5 TABLET ORAL at 17:30

## 2019-02-26 RX ADMIN — AMPICILLIN 500 MG: 500 CAPSULE ORAL at 17:30

## 2019-02-26 RX ADMIN — SENNOSIDES AND DOCUSATE SODIUM 1 TABLET: 8.6; 5 TABLET ORAL at 19:46

## 2019-02-26 RX ADMIN — HEPATITIS B VACCINE (RECOMBINANT) 20 MCG: 20 INJECTION, SUSPENSION INTRAMUSCULAR at 10:12

## 2019-02-26 RX ADMIN — AMPICILLIN 500 MG: 500 CAPSULE ORAL at 04:03

## 2019-02-26 RX ADMIN — OXYCODONE HYDROCHLORIDE 5 MG: 5 TABLET ORAL at 13:15

## 2019-02-26 NOTE — PLAN OF CARE
VSS, pt is encouraged to ambulate in room, mostly is on her phone, reinforced frequency of feeding the baby, instructed to call for help with latch; social work consult is in (has limited resources,  out of country), scored #0 on PPD, working on the birth certificate, continue to monitor.

## 2019-02-26 NOTE — CONSULTS
"[facility]  MATERNAL CHILD HEALTH   INITIAL PSYCHOSOCIAL ASSESSMENT     DATA:       Presenting Information: SW met with Mbzaire \"Ema\" who is  to Dave, together they have 5 yr old daughter Radha and now  daughter Danielle Gant. Ema came to [place] 3 months ago from [place] to stay with her sister in [place] and have her baby here. She states their country \"[place]\" and no one is employed so it's very difficult to have a  there. She is hoping her  and daughter will be able to come to [place] also. She does not have necessary items for baby. She is on WIC and would like a PHN     Social Support: Sister.    Insurance: She has applied for MA    Source of Financial Support: Very little support, her sister is helping her now     Mental Health History: None, Scored Zero on EPDS    History of Postpartum Mood Disorders: None      INTERVENTION:       SW completed chart review and collaborated with the multidisciplinary team.     Psychosocial Assessment     Introduction to Maternal Child Health  role and scope of practice     Reviewed Hospital and Community Resources     Gave Car Seat and Pack n Play and diapers/layette from [facility]    Assessed Chemical Health History and Current Symptoms     Assessed Mental Health History and Current Symptoms     Identified stressors, barriers and family concerns     Provided support and active empathetic listening and validation.     Provided psychoeducation on  mood and anxiety disorders, assessed for any current symptoms or history    Provided brochure Depression and Anxiety During and after Pregnancy.     ASSESSMENT:     Coping: adequate    Affect: Somewhat flat with good eye contact, calm.    Motivation/Ability to Access Services: limited ability to access services    Assessment of Support System: stable    Level of engagement with SW: Engaged and appropriate.     Family and parent/infant interactions: first visit " baby was sleeping in crib, second visit MOB holding/bonding well with baby  Assessment of parental risk for PPMD: Low    Strengths:caring family, willingness to accept help    Vulnerabilities: non-compliance with feeding recommendations, however she states to this writer that she wakes baby to feed every 2 hours.    Identified Barriers: transportation finances, support      PLAN:     SW following and available as needed.

## 2019-02-26 NOTE — PROGRESS NOTES
[facility] Discharge Summary    Eddie Wong MRN# [id number]   Age: 33 year old YOB: 1985     Date of Admission:  2/24/2019  Date of Discharge::  2/26/2019  Admitting Physician:  Elina Toussaint MD  Discharge Physician:  Jennifer Doan MD          Admission Diagnoses:   -IUP at 37w3d  -Labor  -History of Zika exposure this pregnancy  -Hep B non-immunity  -AMA  -UTI currently being treated       Discharge Diagnosis:   -IUP at 37w3d, now delivered          Procedures:   Procedure(s): -VAVD            Medications Prior to Admission:     Medications Prior to Admission   Medication Sig Dispense Refill Last Dose     Prenatal Vit-Fe Fumarate-FA (PRENATAL MULTIVITAMIN W/IRON) 27-0.8 MG tablet Take 1 tablet by mouth daily   2/24/2019 at 0900     [DISCONTINUED] fluconazole (DIFLUCAN) 100 MG tablet Take 100 mg by mouth daily   2/23/2019 at 2100     [DISCONTINUED] nitroFURantoin macrocrystal-monohydrate (MACROBID) 100 MG capsule Take 1 capsule (100 mg) by mouth 2 times daily for 7 days 14 capsule 0 2/24/2019 at 0900             Discharge Medications:     Current Discharge Medication List      START taking these medications    Details   cephALEXin (KEFLEX) 500 MG capsule Take 1 capsule (500 mg) by mouth 2 times daily for 5 days  Qty: 10 capsule, Refills: 0    Associated Diagnoses: Urinary tract infection in mother during third trimester of pregnancy      ibuprofen (ADVIL/MOTRIN) 800 MG tablet Take 1 tablet (800 mg) by mouth every 6 hours as needed for other (cramping)  Qty: 30 tablet, Refills: 1    Associated Diagnoses: Vaginal delivery      senna-docusate (SENOKOT-S/PERICOLACE) 8.6-50 MG tablet Take 1 tablet by mouth 2 times daily as needed for constipation  Qty: 15 tablet, Refills: 1    Associated Diagnoses: Vaginal delivery         CONTINUE these medications which have NOT CHANGED    Details   Prenatal Vit-Fe Fumarate-FA (PRENATAL MULTIVITAMIN W/IRON) 27-0.8 MG tablet Take 1 tablet by mouth daily          STOP taking these medications       fluconazole (DIFLUCAN) 100 MG tablet Comments:   Reason for Stopping:         nitroFURantoin macrocrystal-monohydrate (MACROBID) 100 MG capsule Comments:   Reason for Stopping:                  Brief Admission History   Ms. Eddie Wong is a 33 year old now  who was initially admitted at 37w3d with labor. She was admitted for expectant management.          Brief Intrapartum Course:   Patient progressed spontaneously to complete dilation and began to push. During this process, she was able to bring the fetal station to +2. At this time, there were decelerations noted on the fetal heart rate monitor and because of this a vacuum assisted delivery was recommended to expedite delivery. This was accomplished with one pull and no pop offs. The baby was then completely delivered without difficulty. Placenta delivered spontaneously. A first degree laceration was noted and was repaired in usual fashion as was a sidewall vaginal laceration. QBL: 173. Apgars: 7 and 9. Wt: 2845 grams.           Hospital Course:   The patient's hospital course was unremarkable.  On discharge, her pain was well controlled. Vaginal bleeding is similar to peak menstrual flow.  Voiding without difficulty.  Ambulating well and tolerating a normal diet.  No fever.  Breastfeeding is going ok.  Infant is stable.  No bowel movement yet.  She was discharged on post-partum day #2.    Post-partum hemoglobin:   Hemoglobin   Date Value Ref Range Status   02/24/2019 12.4 11.7 - 15.7 g/dL Final             Discharge Instructions and Follow-Up:   Discharge diet: Regular   Discharge activity: No lifting greater than 20 lbs, pushing, pulling, or other strenuous activity for 6 weeks. Pelvic rest for 6 weeks including no sexual intercourse, tampons, or douching. No driving until you can slam on the breaks without pain or while on narcotic pain medications.    Discharge follow-up: Follow up with your primary OB for a  routine postpartum visit in 6 weeks           Discharge Disposition:   Discharged to home      Ashley Hieronimus, MD [facility]  2/26/2019 7:43 AM

## 2019-02-26 NOTE — PLAN OF CARE
Pt stable and meeting expected goals. VSS. Up ad wendy and independent with cares. Pain managed with oxcodone this shift. Breastfeeding is going well. Bonding with infant. Continue to monitor.

## 2019-02-26 NOTE — PLAN OF CARE
Continues to work on breast feeding, encouraged skin to skin and to call for assist as needed. Declines offers of pain medication this shift, denies difficulty voiding. Several visitors this shift.

## 2019-02-26 NOTE — DISCHARGE INSTRUCTIONS
Lactation: [phone]    Please schedule a follow up with your OBGYN for 6 weeks postpartum.     Postpartum Vaginal Delivery Instructions    Activity       Ask family and friends for help when you need it.    Do not place anything in your vagina for 6 weeks.    You are not restricted on other activities, but take it easy for a few weeks to allow your body to recover from delivery.  You are able to do any activities you feel up to that point.    No driving until you have stopped taking your pain medications (usually two weeks after delivery).     Call your health care provider if you have any of these symptoms:       Increased pain, swelling, redness, or fluid around your stiches from an episiotomy or perineal tear.    A fever above 100.4 F (38 C) with or without chills when placing a thermometer under your tongue.    You soak a sanitary pad with blood within 1 hour, or you see blood clots larger than a golf ball.    Bleeding that lasts more than 6 weeks.    Vaginal discharge that smells bad.    Severe pain, cramping or tenderness in your lower belly area.    A need to urinate more frequently (use the toilet more often), more urgently (use the toilet very quickly), or it burns when you urinate.    Nausea and vomiting.    Redness, swelling or pain around a vein in your leg.    Problems breastfeeding or a red or painful area on your breast.    Chest pain and cough or are gasping for air.    Problems coping with sadness, anxiety, or depression.  If you have any concerns about hurting yourself or the baby, call your provider immediately.     You have questions or concerns after you return home.     Keep your hands clean:  Always wash your hands before touching your perineal area and stitches.  This helps reduce your risk of infection.  If your hands aren't dirty, you may use an alcohol hand-rub to clean your hands. Keep your nails clean and short.

## 2019-02-26 NOTE — PROGRESS NOTES
[facility] Obstetrics Post-Partum Progress Note       S: Doing well. Pain is controlled. Bleeding is stable and improving. She is ambulating, eating, and voiding without difficulty. Continues to work on breast feeding but is currently bottle feeding baby. Patient made aware that plan is for discharge today as she is day 2 from a vaginal delivery and has no medical reason to stay longer. Plan for discharge this evening. If baby needs to stay, she would be interested in boarding.     O:  Vitals:    02/25/19 0816 02/25/19 1202 02/25/19 1559 02/26/19 0230   BP: 139/70 133/81 130/70 125/75   Pulse: 84 79 76 81   Resp: 17 16 18 18   Temp: 98.2  F (36.8  C) 98.1  F (36.7  C) 98  F (36.7  C) 98.2  F (36.8  C)   TempSrc: Oral Oral Oral Oral       Gen: Alert, NAD  Cardio: Regular rate  Resp: Non labored breathing  Abdomen: soft, appropriately tender, fundus firmat the umbilicus  Extremities: No edema, non-tender    A: 33 year old  PPD#2 s/p VAVD. Doing well    P:  Heme: No bleeding concerns, stable vitals  Contraception: Considering LARC  Feeding: Bottle and breast  Rubella: Immune  Rh: Positive  Dispo: discharge home today. May room in if baby needs to stay    Ashley Hieronimus, MD [facility]  2/26/2019 7:42 AM

## 2019-02-26 NOTE — LACTATION NOTE
This note was copied from a baby's chart.  LC follow up.  Infant has been nursing well and often.  Eddie has been giving formula occasionally because she is worried that her baby is not taking enough breastmilk from her.  She has good volumes of colostrum and hears swallows when her baby nurses.  LC encouraged her to breastfeed frequently and avoid formula unless medically indicated.  Plan for follow up tomorrow.

## 2019-02-27 ENCOUNTER — LACTATION ENCOUNTER (OUTPATIENT)
Age: 34
End: 2019-02-27

## 2019-02-27 LAB — COPATH REPORT: NORMAL

## 2019-02-27 NOTE — LACTATION NOTE
This note was copied from a baby's chart.  Follow up lactation.  She has no questions about breastfeeding at this time and states that her baby has been latching on both breasts. She prefers to supplement after latch.  LC discussed the option of weaning off formula as her milk comes in and the importance of emptying her breasts frequently to avoid mastitis.  She is aware she may call prn.  No concerns noted.

## 2019-02-27 NOTE — PLAN OF CARE
"Patient vitally stable, voiding without issue, tolerating regular diet, ambulating independently. Postpartum checks WDL. Patient rating pain 3/10, declined prn Ibuprofen and requested prn Oxy 5mg, given. Educated patient on side effects of narcotic medication. Breast and bottle feeding infant, patient requires frequent reminders on need to wake and feed infant every 2-3 hours. Family briefly present.     Discharge education reviewed with patient. Discharge medications given to and reviewed with patient. Patient noted to have flat affect, disinterested in discharge educations. This writer clarified if patient was comfortable discharging, and patient stated yes that she was just \"tired\". Patient discharged to bed and board status at 2000.   "

## 2019-02-28 NOTE — DISCHARGE SUMMARY
[facility] Discharge Summary    Eddie Wong MRN# [id number]   Age: 33 year old YOB: 1985     Date of Admission:  2/24/2019  Date of Discharge::  2/26/2019  Admitting Physician:  Elina Toussaint MD  Discharge Physician:  Jennifer Doan MD          Admission Diagnoses:   -IUP at 37w3d  -Labor  -History of Zika exposure this pregnancy  -Hep B non-immunity  -AMA  -UTI currently being treated       Discharge Diagnosis:   -IUP at 37w3d, now delivered          Procedures:   Procedure(s): -VAVD            Medications Prior to Admission:     No medications prior to admission.             Discharge Medications:     Current Discharge Medication List      START taking these medications    Details   cephALEXin (KEFLEX) 500 MG capsule Take 1 capsule (500 mg) by mouth 2 times daily for 5 days  Qty: 10 capsule, Refills: 0    Associated Diagnoses: Urinary tract infection in mother during third trimester of pregnancy      ibuprofen (ADVIL/MOTRIN) 800 MG tablet Take 1 tablet (800 mg) by mouth every 6 hours as needed for other (cramping)  Qty: 30 tablet, Refills: 1    Associated Diagnoses: Vaginal delivery      senna-docusate (SENOKOT-S/PERICOLACE) 8.6-50 MG tablet Take 1 tablet by mouth 2 times daily as needed for constipation  Qty: 15 tablet, Refills: 1    Associated Diagnoses: Vaginal delivery         CONTINUE these medications which have NOT CHANGED    Details   Prenatal Vit-Fe Fumarate-FA (PRENATAL MULTIVITAMIN W/IRON) 27-0.8 MG tablet Take 1 tablet by mouth daily         STOP taking these medications       fluconazole (DIFLUCAN) 100 MG tablet Comments:   Reason for Stopping:         nitroFURantoin macrocrystal-monohydrate (MACROBID) 100 MG capsule Comments:   Reason for Stopping:                  Brief Admission History   Ms. Eddie Wong is a 33 year old now  who was initially admitted at 37w3d with labor. She was admitted for expectant management.          Brief Intrapartum Course:   Patient  progressed spontaneously to complete dilation and began to push. During this process, she was able to bring the fetal station to +2. At this time, there were decelerations noted on the fetal heart rate monitor and because of this a vacuum assisted delivery was recommended to expedite delivery. This was accomplished with one pull and no pop offs. The baby was then completely delivered without difficulty. Placenta delivered spontaneously. A first degree laceration was noted and was repaired in usual fashion as was a sidewall vaginal laceration. QBL: 173. Apgars: 7 and 9. Wt: 2845 grams.           Hospital Course:   The patient's hospital course was unremarkable.  On discharge, her pain was well controlled. Vaginal bleeding is similar to peak menstrual flow.  Voiding without difficulty.  Ambulating well and tolerating a normal diet.  No fever.  Breastfeeding is going ok.  Infant is stable.  No bowel movement yet.  She was discharged on post-partum day #2.    Post-partum hemoglobin:   Hemoglobin   Date Value Ref Range Status   02/24/2019 12.4 11.7 - 15.7 g/dL Final             Discharge Instructions and Follow-Up:   Discharge diet: Regular   Discharge activity: No lifting greater than 20 lbs, pushing, pulling, or other strenuous activity for 6 weeks. Pelvic rest for 6 weeks including no sexual intercourse, tampons, or douching. No driving until you can slam on the breaks without pain or while on narcotic pain medications.    Discharge follow-up: Follow up with your primary OB for a routine postpartum visit in 6 weeks           Discharge Disposition:   Discharged to home      Ashley Hieronimus, MD [facility]  2/26/2019 7:43 AM

## 2019-08-07 NOTE — PLAN OF CARE
Data: Patient assessed in the [facility] for uterine contractions.  Cervical exam 1/40/-3.  Membranes intact.  Contractions 2-6.  Action:  Presumed adequate fetal oxygenation documented (see flow record). Discharge instructions reviewed.  Patient instructed to report change in fetal movement, vaginal leaking of fluid or bleeding, abdominal pain, or any concerns related to the pregnancy to her nurse/physician.    Response: Orders to discharge home per Lili Vincent.  Patient verbalized understanding of education and verbalized agreement with plan. Discharged to home.      VOMITING/PAIN/NAUSEA

## 2021-06-22 NOTE — PLAN OF CARE
Dr Doan called about pt's discharge and updated on her status, pt needed to be seen by SW first; updated PARADISE Harvey and she  will be working toward pt discharge to bed and board.   no

## 2022-01-04 NOTE — ADDENDUM NOTE
Addendum  created 02/25/19 1023 by Piter Echavarria    Charge Capture section accepted       Sp/w pt and made aware